# Patient Record
Sex: MALE | Race: BLACK OR AFRICAN AMERICAN | NOT HISPANIC OR LATINO | URBAN - METROPOLITAN AREA
[De-identification: names, ages, dates, MRNs, and addresses within clinical notes are randomized per-mention and may not be internally consistent; named-entity substitution may affect disease eponyms.]

---
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
## 2020-05-14 NOTE — ED PEDIATRIC NURSE NOTE - NS ED NURSE DISCH DISPOSITION
If you are a smoker, it is important for your health to stop smoking. Please be aware that second hand smoke is also harmful. Other (Free Text): - pt will RTC in 3 months\\n- disc will pulse dose Lamisil due to recurrence although was rash free for about 1 year\\n- patient wanted all tablets all at once. Disc can send 4 months only instead of 6months. (28 tablet for 1 week out of each month for 4 mo) Note Text (......Xxx Chief Complaint.): This diagnosis correlates with the Detail Level: Zone Discharged
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
